# Patient Record
Sex: FEMALE | URBAN - METROPOLITAN AREA
[De-identification: names, ages, dates, MRNs, and addresses within clinical notes are randomized per-mention and may not be internally consistent; named-entity substitution may affect disease eponyms.]

---

## 2022-02-25 ENCOUNTER — OFFICE VISIT (OUTPATIENT)
Dept: URBAN - METROPOLITAN AREA CLINIC 23 | Facility: CLINIC | Age: 87
End: 2022-02-25
Payer: MEDICARE

## 2022-02-25 DIAGNOSIS — B02.31 ZOSTER CONJUNCTIVITIS: Primary | ICD-10-CM

## 2022-02-25 PROCEDURE — 99204 OFFICE O/P NEW MOD 45 MIN: CPT | Performed by: OPTOMETRIST

## 2022-02-25 NOTE — IMPRESSION/PLAN
Impression: Zoster conjunctivitis: B02.31. Plan: Pt [facility] on all findings. Continue Valacyclovir as prescribed. Pred Forte tid os for 2 weeks. ATs prn for comfort. Clean lashes daily with wash cloth and baby shampoo gently. RTC if symptoms continue past 2 weeks.